# Patient Record
Sex: MALE | Race: WHITE | NOT HISPANIC OR LATINO | Employment: OTHER | URBAN - NONMETROPOLITAN AREA
[De-identification: names, ages, dates, MRNs, and addresses within clinical notes are randomized per-mention and may not be internally consistent; named-entity substitution may affect disease eponyms.]

---

## 2018-12-27 ENCOUNTER — TRANSCRIBE ORDERS (OUTPATIENT)
Dept: ORTHOPEDIC SURGERY | Facility: CLINIC | Age: 73
End: 2018-12-27

## 2018-12-27 DIAGNOSIS — M51.16 INTERVERTEBRAL DISC DISORDER WITH RADICULOPATHY OF LUMBAR REGION: Primary | ICD-10-CM

## 2018-12-28 DIAGNOSIS — M54.5 LOW BACK PAIN, UNSPECIFIED BACK PAIN LATERALITY, UNSPECIFIED CHRONICITY, WITH SCIATICA PRESENCE UNSPECIFIED: Primary | ICD-10-CM

## 2018-12-31 ENCOUNTER — OFFICE VISIT (OUTPATIENT)
Dept: ORTHOPEDIC SURGERY | Facility: CLINIC | Age: 73
End: 2018-12-31

## 2018-12-31 VITALS — WEIGHT: 190.5 LBS | HEIGHT: 70 IN | BODY MASS INDEX: 27.27 KG/M2

## 2018-12-31 DIAGNOSIS — M54.5 LOW BACK PAIN, UNSPECIFIED BACK PAIN LATERALITY, UNSPECIFIED CHRONICITY, WITH SCIATICA PRESENCE UNSPECIFIED: Primary | ICD-10-CM

## 2018-12-31 DIAGNOSIS — M25.552 HIP PAIN, ACUTE, LEFT: ICD-10-CM

## 2018-12-31 DIAGNOSIS — Z98.890 HX OF DECOMPRESSIVE LUMBAR LAMINECTOMY: ICD-10-CM

## 2018-12-31 DIAGNOSIS — M48.062 SPINAL STENOSIS OF LUMBAR REGION WITH NEUROGENIC CLAUDICATION: ICD-10-CM

## 2018-12-31 PROCEDURE — 99203 OFFICE O/P NEW LOW 30 MIN: CPT | Performed by: ORTHOPAEDIC SURGERY

## 2018-12-31 RX ORDER — ATENOLOL 50 MG/1
TABLET ORAL
COMMUNITY
Start: 2018-11-15

## 2018-12-31 RX ORDER — LOVASTATIN 20 MG/1
TABLET ORAL
COMMUNITY
Start: 2018-12-13

## 2018-12-31 RX ORDER — GABAPENTIN 600 MG/1
TABLET ORAL
COMMUNITY
Start: 2018-10-24

## 2018-12-31 RX ORDER — SULFAMETHOXAZOLE AND TRIMETHOPRIM 800; 160 MG/1; MG/1
TABLET ORAL
COMMUNITY
Start: 2018-09-24

## 2018-12-31 RX ORDER — AMLODIPINE BESYLATE 5 MG/1
TABLET ORAL
COMMUNITY
Start: 2018-12-13

## 2018-12-31 RX ORDER — LISINOPRIL 40 MG/1
TABLET ORAL
COMMUNITY
Start: 2018-12-13

## 2018-12-31 NOTE — PROGRESS NOTES
Mandeep Campbell is a 73 y.o. male   Primary provider:  Johnathan Hamilton MD       Chief Complaint   Patient presents with   • Lumbar Spine - Pain   • Establish Care       HISTORY OF PRESENT ILLNESS: Patient being seen for low back pain. Patient reports pain radiating throughout left hip and left leg. X-rays and MRI done at [facility] and the [facility].     Pain   This is a chronic problem. The current episode started more than 1 year ago. Associated symptoms comments: Burning, clicking. The symptoms are aggravated by standing and walking.     73 y pain and numbness, the pain is present for 1 year, aching pain, the pain is located in the back, located in  groin.  Pain is present with walking and standing.  Intermittent, alleviated sitting.  gradual  + numbness from chest down to the legs, including buttocks, constant.  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  No nighttime awakening pain, no fevers, no chills  Weakness of the leg  Non smoker.     CONCURRENT MEDICAL HISTORY:    History reviewed. No pertinent past medical history.    No Known Allergies      Current Outpatient Medications:   •  amLODIPine (NORVASC) 5 MG tablet, , Disp: , Rfl:   •  atenolol (TENORMIN) 50 MG tablet, , Disp: , Rfl:   •  gabapentin (NEURONTIN) 600 MG tablet, , Disp: , Rfl:   •  lisinopril (PRINIVIL,ZESTRIL) 40 MG tablet, , Disp: , Rfl:   •  lovastatin (MEVACOR) 20 MG tablet, , Disp: , Rfl:   •  sulfamethoxazole-trimethoprim (BACTRIM DS,SEPTRA DS) 800-160 MG per tablet, , Disp: , Rfl:     Past Surgical History:   Procedure Laterality Date   • BACK SURGERY  2016       Family History   Problem Relation Age of Onset   • Hypertension Other         Social History     Socioeconomic History   • Marital status:      Spouse name: Not on file   • Number of children: Not on file   • Years of education: Not on file   • Highest education level: Not on file   Social Needs   • Financial resource strain: Not on file   • Food insecurity - worry: Not on  "file   • Food insecurity - inability: Not on file   • Transportation needs - medical: Not on file   • Transportation needs - non-medical: Not on file   Occupational History   • Not on file   Tobacco Use   • Smoking status: Never Smoker   • Smokeless tobacco: Never Used   Substance and Sexual Activity   • Alcohol use: No     Frequency: Never   • Drug use: Not on file   • Sexual activity: Not on file   Other Topics Concern   • Not on file   Social History Narrative   • Not on file        Review of Systems   Constitutional: Negative.         Always hot/cold   HENT: Negative.    Eyes: Negative.    Respiratory: Negative.    Cardiovascular: Negative.    Gastrointestinal: Negative.    Endocrine: Negative.    Genitourinary: Positive for difficulty urinating.   Musculoskeletal: Negative.         Stiffness   Skin: Negative.    Allergic/Immunologic: Negative.    Neurological: Negative.    Hematological: Negative.    Psychiatric/Behavioral: Negative.    All other systems reviewed and are negative.      PHYSICAL EXAMINATION:       Ht 177.8 cm (70\")   Wt 86.4 kg (190 lb 8 oz)   BMI 27.33 kg/m²     Physical Exam   Constitutional: He is oriented to person, place, and time. He appears well-developed and well-nourished. No distress.   HENT:   Head: Normocephalic.   Mouth/Throat: No oropharyngeal exudate.   Eyes: Pupils are equal, round, and reactive to light. No scleral icterus.   Neck: Normal range of motion. No JVD present. No tracheal deviation present. No thyromegaly present.   Cardiovascular: Normal rate and intact distal pulses.   Pulmonary/Chest: Effort normal. No stridor. No respiratory distress. He has no wheezes.   Abdominal: Soft. He exhibits no distension. There is no tenderness. There is no guarding.   Neurological: He is alert and oriented to person, place, and time. He displays normal reflexes. A sensory deficit is present. No cranial nerve deficit. He exhibits normal muscle tone. Coordination normal.   Skin: Skin is " warm and dry. Capillary refill takes less than 2 seconds. No erythema. No pallor.   Psychiatric: He has a normal mood and affect. His behavior is normal.       GAIT:     []  Normal  [x]  Antalgic    Assistive device: []  None  []  Walker     []  Crutches  [x]  Cane     []  Wheelchair  []  Stretcher    Left Hip Exam     Tenderness   The patient is experiencing tenderness in the anterior.    Range of Motion   Abduction: 25   Adduction: 10   Extension: 10   Flexion: 100   External rotation: 10   Internal rotation: 10     Muscle Strength   Abduction: 5/5   Adduction: 5/5   Flexion: 5/5     Tests   SONJA: positive    Other   Erythema: absent  Sensation: normal  Pulse: present      Back Exam     Tenderness   The patient is experiencing tenderness in the lumbar.    Range of Motion   Extension: 10   Flexion: 60   Lateral bend right: 10   Lateral bend left: 10   Rotation right: 10   Rotation left: 10     Other   Gait: abnormal   Erythema: no back redness  Scars: present        ambulates with locked knee of the left leg.   Flexion of right and left knee is 90  Ambulates with AFO left leg  Weakness of left leg quad and tibialis anterior.      XR Lumbar Spine 2-3 View  9/18/18    Findings: AP and lateral views of the lumbar spine are obtained. The patietn has had prior laminectomy at L3, L4, and L5. There is severe degenerative spurring t multiple levels. There is degenerative disc disease at L3-4, L4-5, and L5-6. No compression deformity is identified.     IMPRESSION: Degenerative changes as described above.      MRI Lumbar Spine  9/27/18    IMPRESSION:  Multilevel degenerative and postoperative changes with significant right-sided neural foraminal stenosis at the L4-5 and L5-S1 levels.       XR Hip with Pelvis Bilateral  9/18/18    Findings: AP pelvis with AP and lateral views of both hips reveal no bony fracture or other bony abnormality. The soft tissues are unremarkable.    IMPRESSION: Negative pelvis and bilateral  hips.        ASSESSMENT:    Diagnoses and all orders for this visit:    Low back pain, unspecified back pain laterality, unspecified chronicity, with sciatica presence unspecified    Spinal stenosis of lumbar region with neurogenic claudication    Hx of decompressive lumbar laminectomy    Hip pain, acute, left  -     MRI Hip Left Without Contrast; Future    Other orders  -     amLODIPine (NORVASC) 5 MG tablet;   -     atenolol (TENORMIN) 50 MG tablet;   -     lisinopril (PRINIVIL,ZESTRIL) 40 MG tablet;   -     lovastatin (MEVACOR) 20 MG tablet;   -     gabapentin (NEURONTIN) 600 MG tablet;   -     sulfamethoxazole-trimethoprim (BACTRIM DS,SEPTRA DS) 800-160 MG per tablet;           PLAN    I offered an epidural which he initially wanted, but then declined.  Additional surgery is questionable.  MRI is ordered for the left hip        Ike Brasher MD

## 2019-01-09 ENCOUNTER — HOSPITAL ENCOUNTER (OUTPATIENT)
Dept: MRI IMAGING | Facility: HOSPITAL | Age: 74
Discharge: HOME | End: 2019-01-09
Attending: ORTHOPAEDIC SURGERY | Admitting: ORTHOPAEDIC SURGERY

## 2019-01-09 DIAGNOSIS — M25.552 HIP PAIN, ACUTE, LEFT: ICD-10-CM

## 2019-01-09 PROCEDURE — 73721 MRI JNT OF LWR EXTRE W/O DYE: CPT

## 2019-01-23 ENCOUNTER — OFFICE VISIT (OUTPATIENT)
Dept: ORTHOPEDIC SURGERY | Facility: CLINIC | Age: 74
End: 2019-01-23

## 2019-01-23 VITALS — BODY MASS INDEX: 25.77 KG/M2 | WEIGHT: 180 LBS | HEIGHT: 70 IN

## 2019-01-23 DIAGNOSIS — M25.552 HIP PAIN, ACUTE, LEFT: Primary | ICD-10-CM

## 2019-01-23 DIAGNOSIS — M48.062 SPINAL STENOSIS OF LUMBAR REGION WITH NEUROGENIC CLAUDICATION: ICD-10-CM

## 2019-01-23 DIAGNOSIS — M54.5 LOW BACK PAIN, UNSPECIFIED BACK PAIN LATERALITY, UNSPECIFIED CHRONICITY, WITH SCIATICA PRESENCE UNSPECIFIED: ICD-10-CM

## 2019-01-23 DIAGNOSIS — Z98.890 HX OF DECOMPRESSIVE LUMBAR LAMINECTOMY: ICD-10-CM

## 2019-01-23 PROCEDURE — 99213 OFFICE O/P EST LOW 20 MIN: CPT | Performed by: ORTHOPAEDIC SURGERY

## 2019-01-23 NOTE — PROGRESS NOTES
"Mandeep Campbell is a 73 y.o. male returns for     Chief Complaint   Patient presents with   • Left Hip - Follow-up   • Results     01/09/19  MRI Hip Left Without Contrast      Mri @ Located within [facility]  HISTORY OF PRESENT ILLNESS:Mri results left hip   Pain scale today 6/10    73 y pain and numbness, the pain is present for 1 year, aching pain, the pain is located in the back, located in  groin.  Pain is present with walking and standing.  Intermittent, alleviated sitting.  gradual  + numbness from chest down to the legs, including buttocks, constant.  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  No nighttime awakening pain, no fevers, no chills  Weakness of the leg  Non smoker.            CONCURRENT MEDICAL HISTORY:    The following portions of the patient's history were reviewed and updated as appropriate: allergies, current medications, past family history, past medical history, past social history, past surgical history and problem list.     ROS  No fevers or chills.  No chest pain or shortness of air.  No GI or  disturbances.    PHYSICAL EXAMINATION:       Ht 177.8 cm (70\")   Wt 81.6 kg (180 lb)   BMI 25.83 kg/m²     Physical Exam   Constitutional: He is oriented to person, place, and time. He appears well-developed and well-nourished. No distress.   HENT:   Head: Normocephalic.   Mouth/Throat: No oropharyngeal exudate.   Eyes: Pupils are equal, round, and reactive to light. No scleral icterus.   Neck: Normal range of motion. No JVD present. No tracheal deviation present. No thyromegaly present.   Cardiovascular: Normal rate and intact distal pulses.   Pulmonary/Chest: Effort normal. No stridor. No respiratory distress. He has no wheezes.   Abdominal: Soft. He exhibits no distension. There is no tenderness. There is no guarding.   Neurological: He is alert and oriented to person, place, and time. He displays normal reflexes. A sensory deficit is present. No cranial nerve deficit. He exhibits normal " muscle tone. Coordination normal.   Skin: Skin is warm and dry. Capillary refill takes less than 2 seconds. No erythema. No pallor.   Psychiatric: He has a normal mood and affect. His behavior is normal.       GAIT:     []  Normal  [x]  Antalgic    Assistive device: []  None  []  Walker     []  Crutches  [x]  Cane     []  Wheelchair  []  Stretcher    Left Hip Exam     Tenderness   The patient is experiencing tenderness in the anterior.    Range of Motion   Abduction: 25   Adduction: 10   Extension: 10   Flexion: 100   External rotation: 10   Internal rotation: 10     Muscle Strength   Abduction: 5/5   Adduction: 5/5   Flexion: 5/5     Tests   SONJA: positive    Other   Erythema: absent  Sensation: normal  Pulse: present      Back Exam     Tenderness   The patient is experiencing tenderness in the lumbar.    Range of Motion   Extension: 10   Flexion: 60   Lateral bend right: 10   Lateral bend left: 10   Rotation right: 10   Rotation left: 10     Other   Gait: abnormal   Erythema: no back redness  Scars: present          Numbness of left leg.  No mass of the inguinal region  No hernia palpated.      Mri Hip Left Without Contrast    Result Date: 1/9/2019  Narrative: MRI of the left hip without contrast HISTORY: Acute left hip pain. Left groin pain. Multisequence multiplanar images of the left hip were obtained without contrast. COMPARISON: None. No osteonecrosis. No transient osteoporosis. No trochanteric bursitis. No significant joint effusion. No fracture or dislocation. No soft tissue mass. Degenerative disc disease L4-5 and L5-S1. Enlarged prostate with associated trabeculated and thickened bladder wall consistent with some degree of outlet obstruction.     Impression: CONCLUSION: Normal MRI of the left hip. Degenerative disc disease L4-5 and L5-S1. Enlarged prostate with associated trabeculated and thickened bladder wall consistent with some degree of outlet obstruction. [id number] Electronically signed by:  George  Tc CLAIRE  1/9/2019 8:37 PM CST Workstation: [id number]     MRI of left hip is negative.          ASSESSMENT:    Diagnoses and all orders for this visit:    Hip pain, acute, left    Spinal stenosis of lumbar region with neurogenic claudication    Low back pain, unspecified back pain laterality, unspecified chronicity, with sciatica presence unspecified    Hx of decompressive lumbar laminectomy          PLAN      Needs to discuss groin pain with urologist or general surgeon    Ike Brasher MD